# Patient Record
Sex: FEMALE | Race: BLACK OR AFRICAN AMERICAN | NOT HISPANIC OR LATINO
[De-identification: names, ages, dates, MRNs, and addresses within clinical notes are randomized per-mention and may not be internally consistent; named-entity substitution may affect disease eponyms.]

---

## 2018-06-02 ENCOUNTER — HOSPITAL (OUTPATIENT)
Dept: OTHER | Age: 38
End: 2018-06-02

## 2018-12-09 ENCOUNTER — WALK IN (OUTPATIENT)
Dept: URGENT CARE | Age: 38
End: 2018-12-09

## 2018-12-09 DIAGNOSIS — J00 ACUTE NASOPHARYNGITIS: Primary | ICD-10-CM

## 2018-12-09 LAB — S PYO AG THROAT QL: NEGATIVE

## 2018-12-09 PROCEDURE — 87880 STREP A ASSAY W/OPTIC: CPT | Performed by: NURSE PRACTITIONER

## 2018-12-09 PROCEDURE — 99203 OFFICE O/P NEW LOW 30 MIN: CPT | Performed by: NURSE PRACTITIONER

## 2018-12-09 RX ORDER — CETIRIZINE HYDROCHLORIDE 10 MG/1
10 TABLET ORAL DAILY
COMMUNITY

## 2018-12-09 ASSESSMENT — ENCOUNTER SYMPTOMS
HEADACHES: 1
EYE REDNESS: 0
EYE ITCHING: 0
DIARRHEA: 0
EYE DISCHARGE: 0
VOMITING: 0
NAUSEA: 0
FEVER: 0
SORE THROAT: 1
COUGH: 0
FATIGUE: 1

## 2018-12-09 ASSESSMENT — PAIN SCALES - GENERAL: PAINLEVEL: 3-4

## 2019-03-31 ENCOUNTER — APPOINTMENT (OUTPATIENT)
Dept: GENERAL RADIOLOGY | Age: 39
End: 2019-03-31
Attending: FAMILY MEDICINE
Payer: COMMERCIAL

## 2019-03-31 ENCOUNTER — HOSPITAL ENCOUNTER (OUTPATIENT)
Age: 39
Discharge: HOME | End: 2019-03-31
Attending: FAMILY MEDICINE
Payer: COMMERCIAL

## 2019-03-31 VITALS
HEART RATE: 108 BPM | DIASTOLIC BLOOD PRESSURE: 74 MMHG | BODY MASS INDEX: 39.27 KG/M2 | SYSTOLIC BLOOD PRESSURE: 124 MMHG | TEMPERATURE: 98 F | WEIGHT: 200 LBS | RESPIRATION RATE: 18 BRPM | HEIGHT: 60 IN | OXYGEN SATURATION: 100 %

## 2019-03-31 DIAGNOSIS — S92.514A CLOSED NONDISPLACED FRACTURE OF PROXIMAL PHALANX OF LESSER TOE OF RIGHT FOOT, INITIAL ENCOUNTER: Primary | ICD-10-CM

## 2019-03-31 PROCEDURE — 28510 TREATMENT OF TOE FRACTURE: CPT

## 2019-03-31 PROCEDURE — 73660 X-RAY EXAM OF TOE(S): CPT | Performed by: FAMILY MEDICINE

## 2019-03-31 PROCEDURE — 99203 OFFICE O/P NEW LOW 30 MIN: CPT

## 2019-03-31 NOTE — ED NOTES
Pt discharged to care of self. Pt assessed by MD. All orders completed and acknowledged. Pt aftercare discussed, all questions answered. Pt confirmed understanding.

## 2019-03-31 NOTE — ED PROVIDER NOTES
Patient Seen in: [address]    History   Patient presents with:  Lower Extremity Injury (musculoskeletal)    Stated Complaint: Injured Toe    HPI    44yo F presents to IC with right fifth toe pain.   Tripped on the stairs and yellow discoloration throughout   Neurological: She is alert and oriented to person, place, and time. Skin: No rash noted. She is not diaphoretic. No erythema. No pallor. Nursing note and vitals reviewed.         ED Course   Labs Reviewed - No data further evaluation and management        Medications Prescribed:  There are no discharge medications for this patient.

## 2019-03-31 NOTE — ED INITIAL ASSESSMENT (HPI)
Pt states last time slipped down the stairs and feels as the toe may have caught on a stair on the way down and believes it could be broken. Pt states it hurt to walk or put pressure on right foot.

## 2019-07-13 ENCOUNTER — WALK IN (OUTPATIENT)
Dept: URGENT CARE | Age: 39
End: 2019-07-13

## 2019-07-13 VITALS
SYSTOLIC BLOOD PRESSURE: 116 MMHG | WEIGHT: 195 LBS | OXYGEN SATURATION: 100 % | DIASTOLIC BLOOD PRESSURE: 84 MMHG | TEMPERATURE: 99.6 F | BODY MASS INDEX: 38.28 KG/M2 | HEART RATE: 112 BPM | RESPIRATION RATE: 18 BRPM | HEIGHT: 60 IN

## 2019-07-13 DIAGNOSIS — J06.9 VIRAL URI WITH COUGH: Primary | ICD-10-CM

## 2019-07-13 PROCEDURE — 99213 OFFICE O/P EST LOW 20 MIN: CPT | Performed by: NURSE PRACTITIONER

## 2019-07-13 ASSESSMENT — ENCOUNTER SYMPTOMS
WHEEZING: 0
FATIGUE: 1
NAUSEA: 0
SINUS PAIN: 1
GASTROINTESTINAL NEGATIVE: 1
FEVER: 1
SHORTNESS OF BREATH: 0
HEADACHES: 0
SINUS PRESSURE: 1
DIAPHORESIS: 1
COUGH: 1
SORE THROAT: 1
CHILLS: 1
SWOLLEN GLANDS: 0
RHINORRHEA: 0

## 2021-05-26 VITALS
DIASTOLIC BLOOD PRESSURE: 84 MMHG | HEART RATE: 88 BPM | BODY MASS INDEX: 38.87 KG/M2 | TEMPERATURE: 98.2 F | HEIGHT: 60 IN | RESPIRATION RATE: 12 BRPM | SYSTOLIC BLOOD PRESSURE: 120 MMHG | WEIGHT: 198 LBS | OXYGEN SATURATION: 100 %